# Patient Record
Sex: FEMALE | Race: WHITE | URBAN - METROPOLITAN AREA
[De-identification: names, ages, dates, MRNs, and addresses within clinical notes are randomized per-mention and may not be internally consistent; named-entity substitution may affect disease eponyms.]

---

## 2023-04-06 ENCOUNTER — OFFICE VISIT (OUTPATIENT)
Dept: URBAN - METROPOLITAN AREA CLINIC 48 | Facility: CLINIC | Age: 78
End: 2023-04-06
Payer: MEDICARE

## 2023-04-06 DIAGNOSIS — H25.12 AGE-RELATED NUCLEAR CATARACT, LEFT EYE: Primary | ICD-10-CM

## 2023-04-06 DIAGNOSIS — H40.013 OPEN ANGLE W/ BORDERLINE FINDING OF BILATERAL EYE, LOW RISK: ICD-10-CM

## 2023-04-06 PROCEDURE — 99204 OFFICE O/P NEW MOD 45 MIN: CPT | Performed by: OPHTHALMOLOGY

## 2023-04-06 ASSESSMENT — INTRAOCULAR PRESSURE
OS: 20
OD: 20

## 2023-04-06 ASSESSMENT — VISUAL ACUITY: OS: 20/60

## 2023-04-06 NOTE — IMPRESSION/PLAN
Impression: Age-related nuclear cataract, left eye: H25.12. Plan: The patient has a visually significant cataract in left eye, after discussion with the patient and careful examination it has been determined that a cataract in left eye is accounting for a significant amount of the patient's visual symptoms. Cataract surgery and the associated risks, benefits, alternatives, expectations, and recovery were discussed in detail with the patient. All questions were answered. The patient understands that there may be some limitation in visual potential given any pre-existing ocular disease. The patient desires cataract surgery in left eye. Patient candidate for ORA and premium lens. Patient elects to use [facility]). All potential side effects and benefits of medication discussed . Patient is to start drop(s) to operative eye one day prior to surgery. Schedule cataract surgery in left eye only.  RL 2

SCHEDULE Ascan then CE/IOL

## 2023-04-06 NOTE — IMPRESSION/PLAN
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 Plan:  Will reassess after CE/IOL with testing

## 2023-04-12 ENCOUNTER — TESTING ONLY (OUTPATIENT)
Dept: URBAN - METROPOLITAN AREA CLINIC 48 | Facility: CLINIC | Age: 78
End: 2023-04-12
Payer: MEDICARE

## 2023-04-12 DIAGNOSIS — H25.12 AGE-RELATED NUCLEAR CATARACT, LEFT EYE: Primary | ICD-10-CM

## 2023-04-12 ASSESSMENT — PACHYMETRY
OS: 2.62
OS: 23.08
OD: 3.82
OD: 22.86

## 2023-04-12 ASSESSMENT — KERATOMETRY
OS: 43.22
OD: 43.30

## 2023-04-19 ENCOUNTER — PROCEDURE (OUTPATIENT)
Dept: URBAN - METROPOLITAN AREA SURGERY 24 | Facility: SURGERY | Age: 78
End: 2023-04-19
Payer: MEDICARE

## 2023-04-19 ENCOUNTER — Encounter (OUTPATIENT)
Dept: URBAN - METROPOLITAN AREA SURGERY 25 | Facility: SURGERY | Age: 78
End: 2023-04-19
Payer: MEDICARE

## 2023-04-19 PROCEDURE — 66984 XCAPSL CTRC RMVL W/O ECP: CPT | Performed by: OPHTHALMOLOGY

## 2023-04-20 ENCOUNTER — POST-OPERATIVE VISIT (OUTPATIENT)
Dept: URBAN - METROPOLITAN AREA CLINIC 48 | Facility: CLINIC | Age: 78
End: 2023-04-20
Payer: MEDICARE

## 2023-04-20 DIAGNOSIS — Z48.810 ENCOUNTER FOR SURGICAL AFTERCARE FOLLOWING SURGERY ON A SENSE ORGAN: Primary | ICD-10-CM

## 2023-04-20 PROCEDURE — 99024 POSTOP FOLLOW-UP VISIT: CPT | Performed by: OPHTHALMOLOGY

## 2023-04-20 RX ORDER — MOXIFLOXACIN 5 MG/ML
0.5 % SOLUTION/ DROPS OPHTHALMIC
Qty: 5 | Refills: 2 | Status: ACTIVE
Start: 2023-04-20

## 2023-04-20 ASSESSMENT — INTRAOCULAR PRESSURE: OS: 25

## 2023-04-20 NOTE — IMPRESSION/PLAN
Impression: S/P Cataract Extraction by phacoemulsification with IOL placement OS - 1 Day. Encounter for surgical aftercare following surgery on a sense organ  Z48.810. Plan: Advised patient to use Oflox/Pred/Ket QID 
went over precautions with patient in room RTC 1wk PO2

## 2023-04-26 ENCOUNTER — POST-OPERATIVE VISIT (OUTPATIENT)
Dept: URBAN - METROPOLITAN AREA CLINIC 48 | Facility: CLINIC | Age: 78
End: 2023-04-26
Payer: MEDICARE

## 2023-04-26 DIAGNOSIS — Z96.1 PRESENCE OF INTRAOCULAR LENS: Primary | ICD-10-CM

## 2023-04-26 PROCEDURE — 99024 POSTOP FOLLOW-UP VISIT: CPT | Performed by: STUDENT IN AN ORGANIZED HEALTH CARE EDUCATION/TRAINING PROGRAM

## 2023-04-26 ASSESSMENT — INTRAOCULAR PRESSURE: OS: 16

## 2023-04-26 NOTE — IMPRESSION/PLAN
Impression: S/P Cataract Extraction by phacoemulsification with IOL placement OS - 7 Days. Presence of intraocular lens  Z96.1. Plan: - Eye is doing well
- d/c ofloxacin and ketorolac - Begin taper of prednisolone acetate to BID x 3 wks. the d/c
- Patient okay to obtain new mrx in 3 wks. after today.
- RT/RD precautions reviewed with the patient, will be leaving town, advise to see ophthalmology if vision changes develop. 
- Can resume normal activities with the exception of avoiding swimming for 1 more week

## 2023-11-06 ENCOUNTER — OFFICE VISIT (OUTPATIENT)
Dept: URBAN - METROPOLITAN AREA CLINIC 48 | Facility: CLINIC | Age: 78
End: 2023-11-06
Payer: MEDICARE

## 2023-11-06 DIAGNOSIS — Z96.1 PRESENCE OF INTRAOCULAR LENS: Primary | ICD-10-CM

## 2023-11-06 DIAGNOSIS — H04.123 DRY EYE SYNDROME OF BILATERAL LACRIMAL GLANDS: ICD-10-CM

## 2023-11-06 DIAGNOSIS — H40.013 OPEN ANGLE W/ BORDERLINE FINDING OF BILATERAL EYE, LOW RISK: ICD-10-CM

## 2023-11-06 PROCEDURE — 92133 CPTRZD OPH DX IMG PST SGM ON: CPT | Performed by: OPTOMETRIST

## 2023-11-06 PROCEDURE — 99204 OFFICE O/P NEW MOD 45 MIN: CPT | Performed by: OPTOMETRIST

## 2023-11-06 ASSESSMENT — INTRAOCULAR PRESSURE
OS: 18
OD: 18